# Patient Record
Sex: FEMALE | Race: WHITE | URBAN - METROPOLITAN AREA
[De-identification: names, ages, dates, MRNs, and addresses within clinical notes are randomized per-mention and may not be internally consistent; named-entity substitution may affect disease eponyms.]

---

## 2021-07-15 ENCOUNTER — OFFICE VISIT (OUTPATIENT)
Dept: URBAN - METROPOLITAN AREA CLINIC 74 | Facility: CLINIC | Age: 56
End: 2021-07-15

## 2021-08-11 ENCOUNTER — OFFICE VISIT (OUTPATIENT)
Dept: URBAN - METROPOLITAN AREA CLINIC 74 | Facility: CLINIC | Age: 56
End: 2021-08-11

## 2021-08-16 ENCOUNTER — OFFICE VISIT (OUTPATIENT)
Dept: URBAN - METROPOLITAN AREA CLINIC 74 | Facility: CLINIC | Age: 56
End: 2021-08-16

## 2021-08-16 RX ORDER — OMEPRAZOLE 40 MG/1
1 CAPSULE 30 MINUTES BEFORE MORNING MEAL CAPSULE, DELAYED RELEASE ORAL ONCE A DAY
Status: ACTIVE | COMMUNITY

## 2021-08-16 RX ORDER — SUCRALFATE 1 G/1
1 TABLET ON AN EMPTY STOMACH TABLET ORAL TWICE A DAY
Status: ACTIVE | COMMUNITY

## 2021-08-16 RX ORDER — ZOLPIDEM TARTRATE 5 MG/1
1 TABLET AT BEDTIME TABLET, FILM COATED ORAL ONCE A DAY
Status: ACTIVE | COMMUNITY

## 2021-08-16 RX ORDER — DEXLANSOPRAZOLE 60 MG/1
1 CAPSULE CAPSULE, DELAYED RELEASE ORAL ONCE A DAY
Status: ACTIVE | COMMUNITY

## 2021-08-16 RX ORDER — LORAZEPAM 1 MG/1
1 TABLET AT BEDTIME AS NEEDED TABLET ORAL ONCE A DAY
Status: ACTIVE | COMMUNITY

## 2021-08-16 RX ORDER — FLUCONAZOLE 100 MG/1
1 TABLET TABLET ORAL
Status: ACTIVE | COMMUNITY

## 2021-08-16 RX ORDER — SERTRALINE 50 MG/1
1 TABLET TABLET, FILM COATED ORAL ONCE A DAY
Status: ACTIVE | COMMUNITY

## 2021-08-16 RX ORDER — ONDANSETRON 4 MG/1
1 TABLET ON THE TONGUE AND ALLOW TO DISSOLVE TABLET, ORALLY DISINTEGRATING ORAL ONCE A DAY
Status: ACTIVE | COMMUNITY

## 2023-04-07 ENCOUNTER — WEB ENCOUNTER (OUTPATIENT)
Dept: URBAN - METROPOLITAN AREA CLINIC 40 | Facility: CLINIC | Age: 58
End: 2023-04-07

## 2023-04-07 ENCOUNTER — LAB OUTSIDE AN ENCOUNTER (OUTPATIENT)
Dept: URBAN - METROPOLITAN AREA CLINIC 40 | Facility: CLINIC | Age: 58
End: 2023-04-07

## 2023-04-07 ENCOUNTER — OFFICE VISIT (OUTPATIENT)
Dept: URBAN - METROPOLITAN AREA CLINIC 40 | Facility: CLINIC | Age: 58
End: 2023-04-07
Payer: COMMERCIAL

## 2023-04-07 VITALS
WEIGHT: 183 LBS | BODY MASS INDEX: 31.24 KG/M2 | SYSTOLIC BLOOD PRESSURE: 132 MMHG | HEIGHT: 64 IN | TEMPERATURE: 97.3 F | DIASTOLIC BLOOD PRESSURE: 80 MMHG | HEART RATE: 95 BPM

## 2023-04-07 DIAGNOSIS — R19.4 CHANGE IN BOWEL HABIT: ICD-10-CM

## 2023-04-07 DIAGNOSIS — R10.13 DYSPEPSIA: ICD-10-CM

## 2023-04-07 DIAGNOSIS — Z12.11 COLON CANCER SCREENING: ICD-10-CM

## 2023-04-07 PROCEDURE — 99204 OFFICE O/P NEW MOD 45 MIN: CPT | Performed by: INTERNAL MEDICINE

## 2023-04-07 RX ORDER — DEXLANSOPRAZOLE 60 MG/1
1 CAPSULE CAPSULE, DELAYED RELEASE ORAL ONCE A DAY
Status: DISCONTINUED | COMMUNITY

## 2023-04-07 RX ORDER — SERTRALINE 50 MG/1
1 TABLET TABLET, FILM COATED ORAL ONCE A DAY
Status: ACTIVE | COMMUNITY

## 2023-04-07 RX ORDER — LEVOTHYROXINE SODIUM 50 UG/1
AS DIRECTED TABLET ORAL
Status: ACTIVE | COMMUNITY
Start: 2023-04-07

## 2023-04-07 RX ORDER — TOPIRAMATE 25 MG/1
AS DIRECTED TABLET, COATED ORAL
Status: ACTIVE | COMMUNITY
Start: 2023-04-07

## 2023-04-07 RX ORDER — ONDANSETRON 4 MG/1
1 TABLET ON THE TONGUE AND ALLOW TO DISSOLVE TABLET, ORALLY DISINTEGRATING ORAL ONCE A DAY
Status: ACTIVE | COMMUNITY

## 2023-04-07 RX ORDER — FLUCONAZOLE 100 MG/1
1 TABLET TABLET ORAL
Status: DISCONTINUED | COMMUNITY

## 2023-04-07 RX ORDER — OMEPRAZOLE 40 MG/1
1 CAPSULE 30 MINUTES BEFORE MORNING MEAL CAPSULE, DELAYED RELEASE ORAL ONCE A DAY
OUTPATIENT

## 2023-04-07 RX ORDER — RABEPRAZOLE SODIUM 20 MG/1
1 TABLET TABLET, DELAYED RELEASE ORAL ONCE A DAY
Qty: 90 | Refills: 0 | OUTPATIENT
Start: 2023-04-07

## 2023-04-07 RX ORDER — OMEPRAZOLE 40 MG/1
1 CAPSULE 30 MINUTES BEFORE MORNING MEAL CAPSULE, DELAYED RELEASE ORAL ONCE A DAY
Status: ACTIVE | COMMUNITY

## 2023-04-07 RX ORDER — ZOLPIDEM TARTRATE 5 MG/1
1 TABLET AT BEDTIME TABLET, FILM COATED ORAL ONCE A DAY
Status: ACTIVE | COMMUNITY

## 2023-04-07 RX ORDER — SUCRALFATE 1 G/1
1 TABLET ON AN EMPTY STOMACH TABLET ORAL TWICE A DAY
Status: DISCONTINUED | COMMUNITY

## 2023-04-07 RX ORDER — LORAZEPAM 1 MG/1
1 TABLET AT BEDTIME AS NEEDED TABLET ORAL ONCE A DAY
Status: ACTIVE | COMMUNITY

## 2023-04-07 RX ORDER — PROGESTERONE 200 MG/1
AS DIRECTED CAPSULE ORAL
Status: ACTIVE | COMMUNITY

## 2023-04-07 NOTE — HPI-TODAY'S VISIT:
Ms. Sandoval is a 57-year-old white female presenting for new patient evaluation of reflux.  Patient states for years she has had issues with her stomach.  She will feel a burning sensation.  She is noted issues with throat clearing, cough and occasional hoarseness.  She is also noting midepigastric discomfort.  She describes it as a squeezing sensation under her bra line.  There is no relationship to eating.  She sometimes feels like she has a sour stomach.  She has occasional nausea without any emesis.  She has been on omeprazole 40 mg and has been on this for a while.  She think she is also had Zantac and Nexium in the past.  She had an EGD with Dr. Israel 6 to 7 years ago.  She states she is never had a screening colonoscopy.  She notes occasional loose stools alternating with constipation.  She states the diarrhea is more predominant and sometimes the stools look greasy and float.  There is no blood in the stool or melena.  Patient states she did a 23 and me genetic test and celiac disease was brought up as a possibility for her.

## 2023-04-20 ENCOUNTER — TELEPHONE ENCOUNTER (OUTPATIENT)
Dept: URBAN - METROPOLITAN AREA CLINIC 40 | Facility: CLINIC | Age: 58
End: 2023-04-20

## 2023-04-21 ENCOUNTER — WEB ENCOUNTER (OUTPATIENT)
Dept: URBAN - METROPOLITAN AREA CLINIC 40 | Facility: CLINIC | Age: 58
End: 2023-04-21

## 2023-04-28 ENCOUNTER — OFFICE VISIT (OUTPATIENT)
Dept: URBAN - METROPOLITAN AREA CLINIC 39 | Facility: CLINIC | Age: 58
End: 2023-04-28
Payer: COMMERCIAL

## 2023-04-28 DIAGNOSIS — R10.13 DYSPEPSIA: ICD-10-CM

## 2023-04-28 DIAGNOSIS — N20.0 KIDNEY CALCULI: ICD-10-CM

## 2023-04-28 PROCEDURE — 76705 ECHO EXAM OF ABDOMEN: CPT | Performed by: INTERNAL MEDICINE

## 2023-05-05 ENCOUNTER — LAB OUTSIDE AN ENCOUNTER (OUTPATIENT)
Dept: URBAN - METROPOLITAN AREA CLINIC 40 | Facility: CLINIC | Age: 58
End: 2023-05-05

## 2023-05-09 LAB
ADENOVIRUS F 40/41: NOT DETECTED
CAMPYLOBACTER: NOT DETECTED
CLOSTRIDIUM DIFFICILE: NOT DETECTED
CRYPTOSPORIDIUM: NOT DETECTED
ENTAMOEBA HISTOLYTICA: NOT DETECTED
ENTEROAGGREGATIVE E.COLI: NOT DETECTED
ENTEROTOXIGENIC E.COLI: NOT DETECTED
ESCHERICHIA COLI O157: NOT DETECTED
FECAL FAT, QUALITATIVE: (no result)
GIARDIA LAMBLIA: NOT DETECTED
NOROVIRUS GI/GII: NOT DETECTED
PANCREATICELASTASE ELISA, STOOL: (no result)
ROTAVIRUS A: NOT DETECTED
SALMONELLA SPP.: NOT DETECTED
SHIGA-LIKE TOXIN PRODUCING E.COLI: NOT DETECTED
SHIGELLA SPP. / ENTEROINVASIVE E.COLI: NOT DETECTED
VIBRIO PARAHAEMOLYTICUS: NOT DETECTED
VIBRIO SPP.: NOT DETECTED
YERSINIA ENTEROCOLITICA: NOT DETECTED

## 2023-05-12 ENCOUNTER — TELEPHONE ENCOUNTER (OUTPATIENT)
Dept: URBAN - METROPOLITAN AREA CLINIC 40 | Facility: CLINIC | Age: 58
End: 2023-05-12

## 2023-05-12 RX ORDER — COLESTIPOL HYDROCHLORIDE 1 G/1
1 TABLET TABLET, FILM COATED ORAL
Qty: 180 | Refills: 1 | OUTPATIENT
Start: 2023-05-12

## 2023-05-22 ENCOUNTER — TELEPHONE ENCOUNTER (OUTPATIENT)
Dept: URBAN - METROPOLITAN AREA CLINIC 40 | Facility: CLINIC | Age: 58
End: 2023-05-22

## 2023-05-25 ENCOUNTER — OFFICE VISIT (OUTPATIENT)
Dept: URBAN - METROPOLITAN AREA SURGERY CENTER 30 | Facility: SURGERY CENTER | Age: 58
End: 2023-05-25

## 2024-01-18 ENCOUNTER — LAB OUTSIDE AN ENCOUNTER (OUTPATIENT)
Dept: URBAN - METROPOLITAN AREA CLINIC 40 | Facility: CLINIC | Age: 59
End: 2024-01-18

## 2024-01-18 ENCOUNTER — OFFICE VISIT (OUTPATIENT)
Dept: URBAN - METROPOLITAN AREA CLINIC 40 | Facility: CLINIC | Age: 59
End: 2024-01-18
Payer: COMMERCIAL

## 2024-01-18 VITALS
HEART RATE: 91 BPM | BODY MASS INDEX: 30.05 KG/M2 | HEIGHT: 64 IN | DIASTOLIC BLOOD PRESSURE: 78 MMHG | SYSTOLIC BLOOD PRESSURE: 138 MMHG | WEIGHT: 176 LBS

## 2024-01-18 DIAGNOSIS — Z53.20 COLON CANCER SCREENING DECLINED: ICD-10-CM

## 2024-01-18 DIAGNOSIS — R19.7 DIARRHEA: ICD-10-CM

## 2024-01-18 DIAGNOSIS — K21.9 GERD: ICD-10-CM

## 2024-01-18 PROCEDURE — 99214 OFFICE O/P EST MOD 30 MIN: CPT | Performed by: INTERNAL MEDICINE

## 2024-01-18 RX ORDER — ONDANSETRON 4 MG/1
1 TABLET ON THE TONGUE AND ALLOW TO DISSOLVE TABLET, ORALLY DISINTEGRATING ORAL ONCE A DAY
Status: ACTIVE | COMMUNITY

## 2024-01-18 RX ORDER — LEVOTHYROXINE SODIUM 50 UG/1
AS DIRECTED TABLET ORAL
Status: ACTIVE | COMMUNITY
Start: 2023-04-07

## 2024-01-18 RX ORDER — COLESTIPOL HYDROCHLORIDE 1 G/1
1 TABLET TABLET, FILM COATED ORAL
Qty: 180 | Refills: 1 | Status: DISCONTINUED | COMMUNITY
Start: 2023-05-12

## 2024-01-18 RX ORDER — SERTRALINE 50 MG/1
1 TABLET TABLET, FILM COATED ORAL ONCE A DAY
Status: ACTIVE | COMMUNITY

## 2024-01-18 RX ORDER — RABEPRAZOLE SODIUM 20 MG/1
1 TABLET TABLET, DELAYED RELEASE ORAL ONCE A DAY
Qty: 90 | Refills: 0 | Status: DISCONTINUED | COMMUNITY
Start: 2023-04-07

## 2024-01-18 RX ORDER — LORAZEPAM 1 MG/1
1 TABLET AT BEDTIME AS NEEDED TABLET ORAL ONCE A DAY
Status: ACTIVE | COMMUNITY

## 2024-01-18 RX ORDER — PROGESTERONE 200 MG/1
AS DIRECTED CAPSULE ORAL
Status: DISCONTINUED | COMMUNITY

## 2024-01-18 RX ORDER — PANTOPRAZOLE SODIUM 40 MG/1
1 TABLET TABLET, DELAYED RELEASE ORAL ONCE A DAY
Status: ACTIVE | COMMUNITY

## 2024-01-18 RX ORDER — ZOLPIDEM TARTRATE 5 MG/1
1 TABLET AT BEDTIME TABLET, FILM COATED ORAL ONCE A DAY
Status: ACTIVE | COMMUNITY

## 2024-01-18 RX ORDER — PANTOPRAZOLE SODIUM 40 MG/1
1 TABLET TABLET, DELAYED RELEASE ORAL ONCE A DAY
OUTPATIENT

## 2024-01-18 RX ORDER — TOPIRAMATE 25 MG/1
AS DIRECTED TABLET, COATED ORAL
Status: ACTIVE | COMMUNITY
Start: 2023-04-07

## 2024-01-18 RX ORDER — DEXLANSOPRAZOLE 60 MG/1
1 CAPSULE CAPSULE, DELAYED RELEASE ORAL ONCE A DAY
Qty: 90 CAPSULE | Refills: 0 | OUTPATIENT
Start: 2024-01-18

## 2024-01-18 NOTE — HPI-TODAY'S VISIT:
Ms. Sandoval presents to clinic for follow-up.  She was last seen in April 2023.  At that appointment she was complaining of a change in her bowels.  She also was complaining of dyspepsia.  We got stool studies that were unremarkable.  Without a right upper quadrant ultrasound in April 2023 that showed a normal liver and gallbladder.  An EGD was ordered but patient canceled.  She states she was hospitalized with palpitations and had a cardiac workup and never returned for follow-up.  We  had changed her from omeprazole to rabeprazole.  She states the reflux is better controlled but the rabeprazole caused side effects with coughing.  She is currently taking pantoprazole but is having breakthrough.  Recall we had offered a colonoscopy but patient declined.  She states she is concerned about prepping due to her history of vasovagal syncope.  She has noted her diarrhea has been worse over the last several weeks.  She is having 2-3 bowel movements that are loose a day.  Sometimes the stools wake her from sleep.  There is no blood in the stool or melena.  She recently got over COVID and states she had been on antibiotics over the last week or 2.  She states she has been taking probiotics with the antibiotic.

## 2024-01-22 ENCOUNTER — TELEPHONE ENCOUNTER (OUTPATIENT)
Dept: URBAN - METROPOLITAN AREA CLINIC 40 | Facility: CLINIC | Age: 59
End: 2024-01-22

## 2024-01-28 ENCOUNTER — LAB OUTSIDE AN ENCOUNTER (OUTPATIENT)
Dept: URBAN - METROPOLITAN AREA CLINIC 40 | Facility: CLINIC | Age: 59
End: 2024-01-28

## 2024-02-01 LAB
ADENOVIRUS F 40/41: NOT DETECTED
CALPROTECTIN, STOOL - QDX: (no result)
CAMPYLOBACTER: NOT DETECTED
CLOSTRIDIUM DIFFICILE: NOT DETECTED
ENTAMOEBA HISTOLYTICA: NOT DETECTED
ENTEROAGGREGATIVE E.COLI: NOT DETECTED
ENTEROTOXIGENIC E.COLI: NOT DETECTED
ESCHERICHIA COLI O157: NOT DETECTED
FECAL FAT, QUALITATIVE: (no result)
GIARDIA LAMBLIA: NOT DETECTED
NOROVIRUS GI/GII: NOT DETECTED
PANCREATICELASTASE ELISA, STOOL: (no result)
ROTAVIRUS A: NOT DETECTED
SALMONELLA SPP.: NOT DETECTED
SHIGA-LIKE TOXIN PRODUCING E.COLI: NOT DETECTED
SHIGELLA SPP. / ENTEROINVASIVE E.COLI: NOT DETECTED
VIBRIO PARAHAEMOLYTICUS: NOT DETECTED
VIBRIO SPP.: NOT DETECTED
YERSINIA ENTEROCOLITICA: NOT DETECTED

## 2024-02-12 ENCOUNTER — EGD (OUTPATIENT)
Dept: URBAN - METROPOLITAN AREA SURGERY CENTER 30 | Facility: SURGERY CENTER | Age: 59
End: 2024-02-12

## 2024-02-26 ENCOUNTER — LAB (OUTPATIENT)
Dept: URBAN - METROPOLITAN AREA CLINIC 4 | Facility: CLINIC | Age: 59
End: 2024-02-26
Payer: COMMERCIAL

## 2024-02-26 ENCOUNTER — EGD (OUTPATIENT)
Dept: URBAN - METROPOLITAN AREA SURGERY CENTER 30 | Facility: SURGERY CENTER | Age: 59
End: 2024-02-26

## 2024-02-26 DIAGNOSIS — K31.89 OTHER DISEASES OF STOMACH AND DUODENUM: ICD-10-CM

## 2024-02-26 PROCEDURE — 88305 TISSUE EXAM BY PATHOLOGIST: CPT | Performed by: PATHOLOGY

## 2024-02-26 PROCEDURE — 88312 SPECIAL STAINS GROUP 1: CPT | Performed by: PATHOLOGY

## 2024-03-04 ENCOUNTER — OV EP (OUTPATIENT)
Dept: URBAN - METROPOLITAN AREA CLINIC 40 | Facility: CLINIC | Age: 59
End: 2024-03-04
Payer: COMMERCIAL

## 2024-03-04 VITALS
BODY MASS INDEX: 30.39 KG/M2 | WEIGHT: 178 LBS | TEMPERATURE: 96.1 F | SYSTOLIC BLOOD PRESSURE: 130 MMHG | DIASTOLIC BLOOD PRESSURE: 78 MMHG | HEART RATE: 64 BPM | HEIGHT: 64 IN

## 2024-03-04 DIAGNOSIS — K21.9 GERD: ICD-10-CM

## 2024-03-04 DIAGNOSIS — K29.70 GASTRITIS: ICD-10-CM

## 2024-03-04 DIAGNOSIS — Z53.20 COLON CANCER SCREENING DECLINED: ICD-10-CM

## 2024-03-04 PROCEDURE — 99214 OFFICE O/P EST MOD 30 MIN: CPT | Performed by: PHYSICIAN ASSISTANT

## 2024-03-04 RX ORDER — LORAZEPAM 1 MG/1
1 TABLET AT BEDTIME AS NEEDED TABLET ORAL ONCE A DAY
Status: ACTIVE | COMMUNITY

## 2024-03-04 RX ORDER — SERTRALINE 50 MG/1
1 TABLET TABLET, FILM COATED ORAL ONCE A DAY
Status: ACTIVE | COMMUNITY

## 2024-03-04 RX ORDER — SUCRALFATE 1 G/1
1 TABLET ON AN EMPTY STOMACH TABLET ORAL TWICE A DAY
Qty: 60 TABLETS | Refills: 1 | OUTPATIENT
Start: 2024-03-04 | End: 2024-05-03

## 2024-03-04 RX ORDER — DEXLANSOPRAZOLE 60 MG/1
1 CAPSULE CAPSULE, DELAYED RELEASE ORAL ONCE A DAY
Qty: 90 CAPSULE | Refills: 0 | Status: ON HOLD | COMMUNITY
Start: 2024-01-18

## 2024-03-04 RX ORDER — ZOLPIDEM TARTRATE 5 MG/1
1 TABLET AT BEDTIME TABLET, FILM COATED ORAL ONCE A DAY
Status: ACTIVE | COMMUNITY

## 2024-03-04 RX ORDER — TOPIRAMATE 25 MG/1
AS DIRECTED TABLET, COATED ORAL
Status: ACTIVE | COMMUNITY
Start: 2023-04-07

## 2024-03-04 RX ORDER — ONDANSETRON 4 MG/1
1 TABLET ON THE TONGUE AND ALLOW TO DISSOLVE TABLET, ORALLY DISINTEGRATING ORAL ONCE A DAY
Status: ACTIVE | COMMUNITY

## 2024-03-04 RX ORDER — LEVOTHYROXINE SODIUM 50 UG/1
AS DIRECTED TABLET ORAL
Status: ACTIVE | COMMUNITY
Start: 2023-04-07

## 2024-03-04 NOTE — HPI-TODAY'S VISIT:
Ms. Sandoval FAUSTO 58 year old female, last seen 1/18/24 with dyspeptic symptoms. Diarrhea. History of abdominal pain. We got a right upper quadrant ultrasound in April 2023 that showed a normal liver and gallbladder.  An EGD was ordered but patient canceled.  She states she was hospitalized with palpitations and had a cardiac workup and never returned for follow-up.  We  had changed her from omeprazole to rabeprazole.  She states the reflux is better controlled but the rabeprazole caused side effects with coughing.  She is currently taking pantoprazole but is having breakthrough.  Recall we had offered a colonoscopy but patient declined.  She states she is concerned about prepping due to her history of vasovagal syncope.  She has noted her diarrhea has been worse over the last several weeks.  She is having 2-3 bowel movements that are loose a day.  Sometimes the stools wake her from sleep.  There is no blood in the stool or melena.  She recently got over COVID and states she had been on antibiotics.  She states she has been taking probiotics with the antibiotic. She did have an EGD February 26, 2024 with findings of mild gastritis.  Normal duodenum.  Biopsies from the stomach with no significant abnormality.  Similar findings of the small bowel with no evidence of celiac sprue, biopsy due to complaint of diarrhea.  Reflux changes in the esophagus with no Sanchez's metaplasia, EOE or dysplasia identified.  Stool studies were once again checked and included normal fecal fat and elastase.  Fecal calprotectin normal.  No evidence of infection. Her diarrhea has improved overall.  She was not able to get prescription for Dexilant. Yes

## 2024-04-03 ENCOUNTER — OV EP (OUTPATIENT)
Dept: URBAN - METROPOLITAN AREA CLINIC 40 | Facility: CLINIC | Age: 59
End: 2024-04-03

## 2024-05-28 ENCOUNTER — ERX REFILL RESPONSE (OUTPATIENT)
Dept: URBAN - METROPOLITAN AREA CLINIC 40 | Facility: CLINIC | Age: 59
End: 2024-05-28

## 2024-05-28 RX ORDER — SUCRALFATE 1 G/1
TAKE 1 TABLET BY MOUTH TWICE DAILY ON AN EMPTY STOMACH TABLET ORAL
Qty: 60 TABLET | Refills: 0 | OUTPATIENT

## 2024-05-29 ENCOUNTER — TELEPHONE ENCOUNTER (OUTPATIENT)
Dept: URBAN - METROPOLITAN AREA CLINIC 40 | Facility: CLINIC | Age: 59
End: 2024-05-29

## 2024-06-20 ENCOUNTER — TELEPHONE ENCOUNTER (OUTPATIENT)
Dept: URBAN - METROPOLITAN AREA CLINIC 80 | Facility: CLINIC | Age: 59
End: 2024-06-20

## 2024-06-21 ENCOUNTER — OFFICE VISIT (OUTPATIENT)
Dept: URBAN - METROPOLITAN AREA CLINIC 80 | Facility: CLINIC | Age: 59
End: 2024-06-21

## 2024-06-24 ENCOUNTER — OFFICE VISIT (OUTPATIENT)
Dept: URBAN - METROPOLITAN AREA CLINIC 40 | Facility: CLINIC | Age: 59
End: 2024-06-24

## 2024-06-24 RX ORDER — TOPIRAMATE 25 MG/1
AS DIRECTED TABLET, COATED ORAL
Status: ACTIVE | COMMUNITY
Start: 2023-04-07

## 2024-06-24 RX ORDER — LORAZEPAM 1 MG/1
1 TABLET AT BEDTIME AS NEEDED TABLET ORAL ONCE A DAY
Status: ACTIVE | COMMUNITY

## 2024-06-24 RX ORDER — ONDANSETRON 4 MG/1
1 TABLET ON THE TONGUE AND ALLOW TO DISSOLVE TABLET, ORALLY DISINTEGRATING ORAL ONCE A DAY
Status: ACTIVE | COMMUNITY

## 2024-06-24 RX ORDER — SERTRALINE 50 MG/1
1 TABLET TABLET, FILM COATED ORAL ONCE A DAY
Status: ACTIVE | COMMUNITY

## 2024-06-24 RX ORDER — ZOLPIDEM TARTRATE 5 MG/1
1 TABLET AT BEDTIME TABLET, FILM COATED ORAL ONCE A DAY
Status: ACTIVE | COMMUNITY

## 2024-06-24 RX ORDER — DEXLANSOPRAZOLE 60 MG/1
1 CAPSULE CAPSULE, DELAYED RELEASE ORAL ONCE A DAY
Qty: 90 CAPSULE | Refills: 0 | Status: ON HOLD | COMMUNITY
Start: 2024-01-18

## 2024-06-24 RX ORDER — LEVOTHYROXINE SODIUM 50 UG/1
AS DIRECTED TABLET ORAL
Status: ACTIVE | COMMUNITY
Start: 2023-04-07

## 2024-06-24 RX ORDER — SUCRALFATE 1 G/1
TAKE 1 TABLET BY MOUTH TWICE DAILY ON AN EMPTY STOMACH TABLET ORAL
Qty: 60 TABLET | Refills: 0 | Status: ACTIVE | COMMUNITY

## 2024-06-24 NOTE — HPI-TODAY'S VISIT:
Ms. Sandoval FAUSTO 58 year old female, last seen 1/18/24 with dyspeptic symptoms. Diarrhea. History of abdominal pain. We got a right upper quadrant ultrasound in April 2023 that showed a normal liver and gallbladder.  An EGD was ordered but patient canceled.  She states she was hospitalized with palpitations and had a cardiac workup and never returned for follow-up.  We  had changed her from omeprazole to rabeprazole.  She states the reflux is better controlled but the rabeprazole caused side effects with coughing.  She is currently taking pantoprazole but is having breakthrough.  Recall we had offered a colonoscopy but patient declined.  She states she is concerned about prepping due to her history of vasovagal syncope.  She has noted her diarrhea has been worse over the last several weeks.  She is having 2-3 bowel movements that are loose a day.  Sometimes the stools wake her from sleep.  There is no blood in the stool or melena.  She recently got over COVID and states she had been on antibiotics.  She states she has been taking probiotics with the antibiotic. She did have an EGD February 26, 2024 with findings of mild gastritis.  Normal duodenum.  Biopsies from the stomach with no significant abnormality.  Similar findings of the small bowel with no evidence of celiac sprue, biopsy due to complaint of diarrhea.  Reflux changes in the esophagus with no Sanchez's metaplasia, EOE or dysplasia identified.  Stool studies were once again checked and included normal fecal fat and elastase.  Fecal calprotectin normal.  No evidence of infection. Her diarrhea has improved overall.  She was not able to get prescription for Dexilant.

## 2024-06-27 ENCOUNTER — ERX REFILL RESPONSE (OUTPATIENT)
Dept: URBAN - METROPOLITAN AREA CLINIC 40 | Facility: CLINIC | Age: 59
End: 2024-06-27

## 2024-06-27 RX ORDER — SUCRALFATE 1 G/1
TAKE 1 TABLET BY MOUTH TWICE DAILY ON AN EMPTY STOMACH TABLET ORAL
Qty: 60 TABLET | Refills: 0 | OUTPATIENT

## 2024-07-29 ENCOUNTER — ERX REFILL RESPONSE (OUTPATIENT)
Dept: URBAN - METROPOLITAN AREA CLINIC 40 | Facility: CLINIC | Age: 59
End: 2024-07-29

## 2024-07-29 RX ORDER — SUCRALFATE 1 G/1
TAKE 1 TABLET BY MOUTH TWICE DAILY ON AN EMPTY STOMACH TABLET ORAL
Qty: 60 TABLET | Refills: 0 | OUTPATIENT

## 2024-07-31 ENCOUNTER — DASHBOARD ENCOUNTERS (OUTPATIENT)
Age: 59
End: 2024-07-31

## 2024-08-01 PROBLEM — 197321007: Status: ACTIVE | Noted: 2024-08-01

## 2024-08-07 ENCOUNTER — OFFICE VISIT (OUTPATIENT)
Dept: URBAN - METROPOLITAN AREA CLINIC 74 | Facility: CLINIC | Age: 59
End: 2024-08-07
Payer: COMMERCIAL

## 2024-08-07 ENCOUNTER — LAB OUTSIDE AN ENCOUNTER (OUTPATIENT)
Dept: URBAN - METROPOLITAN AREA CLINIC 74 | Facility: CLINIC | Age: 59
End: 2024-08-07

## 2024-08-07 VITALS
HEART RATE: 99 BPM | WEIGHT: 171 LBS | DIASTOLIC BLOOD PRESSURE: 88 MMHG | TEMPERATURE: 98 F | HEIGHT: 63 IN | BODY MASS INDEX: 30.3 KG/M2 | SYSTOLIC BLOOD PRESSURE: 110 MMHG

## 2024-08-07 DIAGNOSIS — K52.89 (LYMPHOCYTIC) MICROSCOPIC COLITIS: ICD-10-CM

## 2024-08-07 DIAGNOSIS — K76.0 HEPATIC STEATOSIS: ICD-10-CM

## 2024-08-07 DIAGNOSIS — K29.30 CHRONIC SUPERFICIAL GASTRITIS WITHOUT BLEEDING: ICD-10-CM

## 2024-08-07 DIAGNOSIS — K92.1 HEMATOCHEZIA: ICD-10-CM

## 2024-08-07 PROBLEM — 266433003: Status: ACTIVE | Noted: 2024-08-07

## 2024-08-07 PROBLEM — 196735001: Status: ACTIVE | Noted: 2024-08-07

## 2024-08-07 PROCEDURE — 99214 OFFICE O/P EST MOD 30 MIN: CPT | Performed by: PHYSICIAN ASSISTANT

## 2024-08-07 RX ORDER — TOPIRAMATE 25 MG/1
AS DIRECTED TABLET, COATED ORAL
Status: ACTIVE | COMMUNITY
Start: 2023-04-07

## 2024-08-07 RX ORDER — SUCRALFATE 1 G/1
TAKE 1 TABLET BY MOUTH TWICE DAILY ON AN EMPTY STOMACH TABLET ORAL
Qty: 60 TABLET | Refills: 0 | Status: DISCONTINUED | COMMUNITY

## 2024-08-07 RX ORDER — PANTOPRAZOLE SODIUM 40 MG/1
1 TABLET TABLET, DELAYED RELEASE ORAL ONCE A DAY
Status: ACTIVE | COMMUNITY

## 2024-08-07 RX ORDER — ONDANSETRON 4 MG/1
1 TABLET ON THE TONGUE AND ALLOW TO DISSOLVE TABLET, ORALLY DISINTEGRATING ORAL ONCE A DAY
Status: ACTIVE | COMMUNITY

## 2024-08-07 RX ORDER — ZOLPIDEM TARTRATE 5 MG/1
1 TABLET AT BEDTIME TABLET, FILM COATED ORAL ONCE A DAY
Status: ACTIVE | COMMUNITY

## 2024-08-07 RX ORDER — DICYCLOMINE HYDROCHLORIDE 20 MG/1
1 TABLET TABLET ORAL THREE TIMES A DAY
Status: ON HOLD | COMMUNITY

## 2024-08-07 RX ORDER — SERTRALINE 50 MG/1
1 TABLET TABLET, FILM COATED ORAL ONCE A DAY
Status: ACTIVE | COMMUNITY

## 2024-08-07 RX ORDER — LEVOTHYROXINE SODIUM 50 UG/1
AS DIRECTED TABLET ORAL
Status: ACTIVE | COMMUNITY
Start: 2023-04-07

## 2024-08-07 RX ORDER — LORAZEPAM 1 MG/1
1 TABLET AT BEDTIME AS NEEDED TABLET ORAL ONCE A DAY
Status: ACTIVE | COMMUNITY

## 2024-08-07 NOTE — HPI-TODAY'S VISIT:
The patient is 59-year-old female with past medical history as noted below known to Dr. Cortes is presented to our clinic today to schedule colon cancer screening. The patient was seen in ED at  on 05/30/2027 with complaint of abdominal pain and bloody stool.  Labs and imaging as noted below.  She was treated for colitis with Augmentin 875 mg for 10 days therapy and Dicyclomine 20 mg 1 tablet every 6 hours as needed.  She states that she is no longer on Dicyclomine. She has alternating bowel movement on and off for years. She saw [facility] in May of 2024 for Vasovagal syndrome and SVT. Cardiac work up unremarakble. She currently takes Pantoprazole 40 mg once daily with good response. No other gI issues today.   Diagnostic studies: -- CT scan of the abdomen and pelvis with IV contrast on 05/30/2024 with long segment edematous thickening of the descending and sigmoid colon likely representing colitis.  There is no evidence of small bowel obstruction or acute appendicitis.  Edematous thickening of the gastric antral wall, which may represent gastritis.  Small free fluid in the pelvis.  Hepatic steatosis.  -- Labs on 05/30/2024 BMP with BUN 12, creatinine 0.9, sodium 137, potassium 4.1.  CBC with WBC 13.2, hemoglobin 14.4, hematocrit 43.3, platelet 280.  UA unremarkable.  Procedure: -- EGD with biopsy on 02/26/2024 by Dr. Cortes noted Z-line irregular, 38 cm from incisors.  Gastritis.  No gross lesion entire examined duodenum.  Biopsy small bowel with no significant abnormality.  Stomach with no significant abnormality.  Esophagus with reflux type changes.  No Sanchez's esophagus or eosinophilic esophagitis.

## 2024-08-08 ENCOUNTER — TELEPHONE ENCOUNTER (OUTPATIENT)
Dept: URBAN - METROPOLITAN AREA CLINIC 74 | Facility: CLINIC | Age: 59
End: 2024-08-08

## 2024-08-08 LAB
ABSOLUTE BASOPHILS: 62
ABSOLUTE EOSINOPHILS: 101
ABSOLUTE LYMPHOCYTES: 1771
ABSOLUTE MONOCYTES: 382
ABSOLUTE NEUTROPHILS: 5483
ALBUMIN/GLOBULIN RATIO: 1.5
ALBUMIN: 4.1
ALKALINE PHOSPHATASE: 29
ALT: 23
AST: 17
BASOPHILS: 0.8
BILIRUBIN, TOTAL: 0.4
BUN/CREATININE RATIO: (no result)
C-REACTIVE PROTEIN, QUANT: 9.1
CALCIUM: 9.1
CARBON DIOXIDE: 20
CHLORIDE: 104
CREATININE: 1.02
EGFR: 63
EOSINOPHILS: 1.3
FIB 4 INDEX: 0.84
FIB 4 INTERPRETATION: (no result)
GGT: 30
GLOBULIN: 2.8
GLUCOSE: 113
HEMATOCRIT: 41.5
HEMOGLOBIN: 13.6
LYMPHOCYTES: 22.7
MCH: 30.9
MCHC: 32.8
MCV: 94.3
MONOCYTES: 4.9
MPV: 10.2
NEUTROPHILS: 70.3
PLATELET COUNT: 257
PLATELET COUNT: 257
POTASSIUM: 4
PROTEIN, TOTAL: 6.9
RDW: 12.7
RED BLOOD CELL COUNT: 4.4
SODIUM: 136
UREA NITROGEN (BUN): 16
WHITE BLOOD CELL COUNT: 7.8

## 2024-08-19 ENCOUNTER — OFFICE VISIT (OUTPATIENT)
Dept: URBAN - METROPOLITAN AREA CLINIC 40 | Facility: CLINIC | Age: 59
End: 2024-08-19

## 2024-09-04 ENCOUNTER — TELEPHONE ENCOUNTER (OUTPATIENT)
Dept: URBAN - METROPOLITAN AREA CLINIC 74 | Facility: CLINIC | Age: 59
End: 2024-09-04

## 2024-10-15 ENCOUNTER — TELEPHONE ENCOUNTER (OUTPATIENT)
Dept: URBAN - METROPOLITAN AREA CLINIC 74 | Facility: CLINIC | Age: 59
End: 2024-10-15

## 2024-10-15 RX ORDER — PANTOPRAZOLE SODIUM 40 MG/1
1 TABLET TABLET, DELAYED RELEASE ORAL ONCE A DAY
Qty: 90 TABLET | Refills: 1

## 2024-12-05 ENCOUNTER — TELEPHONE ENCOUNTER (OUTPATIENT)
Dept: URBAN - METROPOLITAN AREA CLINIC 40 | Facility: CLINIC | Age: 59
End: 2024-12-05

## 2024-12-05 ENCOUNTER — OFFICE VISIT (OUTPATIENT)
Dept: URBAN - METROPOLITAN AREA MEDICAL CENTER 9 | Facility: MEDICAL CENTER | Age: 59
End: 2024-12-05
Payer: COMMERCIAL

## 2024-12-05 DIAGNOSIS — Z12.11 COLON CANCER SCREENING: ICD-10-CM

## 2024-12-05 DIAGNOSIS — K63.89 OTHER SPECIFIED DISEASES OF INTESTINE: ICD-10-CM

## 2024-12-05 PROCEDURE — 45380 COLONOSCOPY AND BIOPSY: CPT | Performed by: INTERNAL MEDICINE

## 2024-12-05 RX ORDER — TOPIRAMATE 25 MG/1
AS DIRECTED TABLET, COATED ORAL
Status: ACTIVE | COMMUNITY
Start: 2023-04-07

## 2024-12-05 RX ORDER — SERTRALINE 50 MG/1
1 TABLET TABLET, FILM COATED ORAL ONCE A DAY
Status: ACTIVE | COMMUNITY

## 2024-12-05 RX ORDER — DICYCLOMINE HYDROCHLORIDE 20 MG/1
1 TABLET TABLET ORAL THREE TIMES A DAY
Status: ON HOLD | COMMUNITY

## 2024-12-05 RX ORDER — LORAZEPAM 1 MG/1
1 TABLET AT BEDTIME AS NEEDED TABLET ORAL ONCE A DAY
Status: ACTIVE | COMMUNITY

## 2024-12-05 RX ORDER — ZOLPIDEM TARTRATE 5 MG/1
1 TABLET AT BEDTIME TABLET, FILM COATED ORAL ONCE A DAY
Status: ACTIVE | COMMUNITY

## 2024-12-05 RX ORDER — ONDANSETRON 4 MG/1
1 TABLET ON THE TONGUE AND ALLOW TO DISSOLVE TABLET, ORALLY DISINTEGRATING ORAL ONCE A DAY
Status: ACTIVE | COMMUNITY

## 2024-12-05 RX ORDER — LEVOTHYROXINE SODIUM 50 UG/1
AS DIRECTED TABLET ORAL
Status: ACTIVE | COMMUNITY
Start: 2023-04-07

## 2024-12-05 RX ORDER — PANTOPRAZOLE SODIUM 40 MG/1
1 TABLET TABLET, DELAYED RELEASE ORAL ONCE A DAY
Qty: 90 TABLET | Refills: 1 | Status: ACTIVE | COMMUNITY

## 2024-12-05 RX ORDER — PANTOPRAZOLE SODIUM 40 MG/1
1 TABLET TABLET, DELAYED RELEASE ORAL ONCE A DAY
Qty: 90 TABLET | Refills: 1

## 2024-12-20 ENCOUNTER — OFFICE VISIT (OUTPATIENT)
Dept: URBAN - METROPOLITAN AREA CLINIC 74 | Facility: CLINIC | Age: 59
End: 2024-12-20

## 2024-12-20 NOTE — HPI-TODAY'S VISIT:
The patient is 59-year-old female with past medical history as noted below known to Dr. Cortes is presenting to our clinic to discuss her recent Colonoscopy results.   Diagnostic studies: -- Labs on 08/07/2024 CBC, CMP, GGT, and FIB-4 INDEX 0.84 with unremarkable results. CRP 9.1.  -- CT scan of the abdomen and pelvis with IV contrast on 05/30/2024 with long segment edematous thickening of the descending and sigmoid colon likely representing colitis.  There is no evidence of small bowel obstruction or acute appendicitis.  Edematous thickening of the gastric antral wall, which may represent gastritis.  Small free fluid in the pelvis.  Hepatic steatosis.  -- Labs on 05/30/2024 BMP with BUN 12, creatinine 0.9, sodium 137, potassium 4.1.  CBC with WBC 13.2, hemoglobin 14.4, hematocrit 43.3, platelet 280.  UA unremarkable.  Procedures: -- Colonoscopy on 12/05/2024 by Dr. Cortes noted the examined portion of the ileum was normal.  Internal hemorrhoids.  Normal mucosa entire examined colon.  Repeat colonoscopy in 10 years for surveillance.  Biopsy with no acute, chronic, or microscopic colitis.  -- EGD with biopsy on 02/26/2024 by Dr. Cortes noted Z-line irregular, 38 cm from incisors.  Gastritis.  No gross lesion entire examined duodenum.  Biopsy small bowel with no significant abnormality.  Stomach with no significant abnormality.  Esophagus with reflux type changes.  No Sanchez's esophagus or eosinophilic esophagitis.

## 2025-02-11 ENCOUNTER — OFFICE VISIT (OUTPATIENT)
Dept: URBAN - METROPOLITAN AREA CLINIC 74 | Facility: CLINIC | Age: 60
End: 2025-02-11

## 2025-02-11 RX ORDER — TOPIRAMATE 25 MG/1
AS DIRECTED TABLET, COATED ORAL
Status: ACTIVE | COMMUNITY
Start: 2023-04-07

## 2025-02-11 RX ORDER — LORAZEPAM 1 MG/1
1 TABLET AT BEDTIME AS NEEDED TABLET ORAL ONCE A DAY
Status: ACTIVE | COMMUNITY

## 2025-02-11 RX ORDER — ZOLPIDEM TARTRATE 5 MG/1
1 TABLET AT BEDTIME TABLET, FILM COATED ORAL ONCE A DAY
Status: ACTIVE | COMMUNITY

## 2025-02-11 RX ORDER — ONDANSETRON 4 MG/1
1 TABLET ON THE TONGUE AND ALLOW TO DISSOLVE TABLET, ORALLY DISINTEGRATING ORAL ONCE A DAY
Status: ACTIVE | COMMUNITY

## 2025-02-11 RX ORDER — LEVOTHYROXINE SODIUM 50 UG/1
AS DIRECTED TABLET ORAL
Status: ACTIVE | COMMUNITY
Start: 2023-04-07

## 2025-02-11 RX ORDER — SERTRALINE 50 MG/1
1 TABLET TABLET, FILM COATED ORAL ONCE A DAY
Status: ACTIVE | COMMUNITY

## 2025-02-11 RX ORDER — PANTOPRAZOLE SODIUM 40 MG/1
1 TABLET TABLET, DELAYED RELEASE ORAL ONCE A DAY
Qty: 90 TABLET | Refills: 1 | Status: ACTIVE | COMMUNITY

## 2025-02-21 ENCOUNTER — OFFICE VISIT (OUTPATIENT)
Dept: URBAN - METROPOLITAN AREA CLINIC 74 | Facility: CLINIC | Age: 60
End: 2025-02-21

## 2025-02-21 RX ORDER — PANTOPRAZOLE SODIUM 40 MG/1
1 TABLET TABLET, DELAYED RELEASE ORAL ONCE A DAY
Qty: 90 TABLET | Refills: 1 | Status: ACTIVE | COMMUNITY

## 2025-02-21 RX ORDER — ONDANSETRON 4 MG/1
1 TABLET ON THE TONGUE AND ALLOW TO DISSOLVE TABLET, ORALLY DISINTEGRATING ORAL ONCE A DAY
Status: ACTIVE | COMMUNITY

## 2025-02-21 RX ORDER — ZOLPIDEM TARTRATE 5 MG/1
1 TABLET AT BEDTIME TABLET, FILM COATED ORAL ONCE A DAY
Status: ACTIVE | COMMUNITY

## 2025-02-21 RX ORDER — LEVOTHYROXINE SODIUM 50 UG/1
AS DIRECTED TABLET ORAL
Status: ACTIVE | COMMUNITY
Start: 2023-04-07

## 2025-02-21 RX ORDER — LORAZEPAM 1 MG/1
1 TABLET AT BEDTIME AS NEEDED TABLET ORAL ONCE A DAY
Status: ACTIVE | COMMUNITY

## 2025-02-21 RX ORDER — SERTRALINE 50 MG/1
1 TABLET TABLET, FILM COATED ORAL ONCE A DAY
Status: ACTIVE | COMMUNITY

## 2025-02-21 RX ORDER — TOPIRAMATE 25 MG/1
AS DIRECTED TABLET, COATED ORAL
Status: ACTIVE | COMMUNITY
Start: 2023-04-07

## 2025-03-25 ENCOUNTER — LAB OUTSIDE AN ENCOUNTER (OUTPATIENT)
Dept: URBAN - METROPOLITAN AREA CLINIC 74 | Facility: CLINIC | Age: 60
End: 2025-03-25

## 2025-03-25 ENCOUNTER — OFFICE VISIT (OUTPATIENT)
Dept: URBAN - METROPOLITAN AREA CLINIC 74 | Facility: CLINIC | Age: 60
End: 2025-03-25
Payer: COMMERCIAL

## 2025-03-25 DIAGNOSIS — K21.00 CHRONIC REFLUX ESOPHAGITIS: ICD-10-CM

## 2025-03-25 PROCEDURE — 99214 OFFICE O/P EST MOD 30 MIN: CPT | Performed by: INTERNAL MEDICINE

## 2025-03-25 RX ORDER — SERTRALINE 50 MG/1
1 TABLET TABLET, FILM COATED ORAL ONCE A DAY
Status: ACTIVE | COMMUNITY

## 2025-03-25 RX ORDER — LEVOTHYROXINE SODIUM 50 UG/1
AS DIRECTED TABLET ORAL
Status: ACTIVE | COMMUNITY
Start: 2023-04-07

## 2025-03-25 RX ORDER — DICYCLOMINE HYDROCHLORIDE 20 MG/1
1 TABLET TABLET ORAL THREE TIMES A DAY
Status: ON HOLD | COMMUNITY

## 2025-03-25 RX ORDER — LORAZEPAM 1 MG/1
1 TABLET AT BEDTIME AS NEEDED TABLET ORAL ONCE A DAY
Status: ACTIVE | COMMUNITY

## 2025-03-25 RX ORDER — ZOLPIDEM TARTRATE 5 MG/1
1 TABLET AT BEDTIME TABLET, FILM COATED ORAL ONCE A DAY
Status: ACTIVE | COMMUNITY

## 2025-03-25 RX ORDER — RABEPRAZOLE SODIUM 20 MG/1
1 TABLET TABLET, DELAYED RELEASE ORAL ONCE A DAY
Qty: 90 | Refills: 0 | OUTPATIENT
Start: 2025-03-25

## 2025-03-25 RX ORDER — PANTOPRAZOLE SODIUM 40 MG/1
1 TABLET TABLET, DELAYED RELEASE ORAL ONCE A DAY
Qty: 90 TABLET | Refills: 1 | Status: ACTIVE | COMMUNITY

## 2025-03-25 RX ORDER — ONDANSETRON 4 MG/1
1 TABLET ON THE TONGUE AND ALLOW TO DISSOLVE TABLET, ORALLY DISINTEGRATING ORAL ONCE A DAY
Status: ACTIVE | COMMUNITY

## 2025-03-25 RX ORDER — PANTOPRAZOLE SODIUM 40 MG/1
1 TABLET TABLET, DELAYED RELEASE ORAL ONCE A DAY
OUTPATIENT

## 2025-03-25 RX ORDER — TOPIRAMATE 25 MG/1
AS DIRECTED TABLET, COATED ORAL
Status: ON HOLD | COMMUNITY
Start: 2023-04-07

## 2025-03-25 NOTE — PHYSICAL EXAM GASTROINTESTINAL
Abdomen , soft,  mild SAGAR tenderness, nondistended , no guarding or rigidity , no masses palpable , normal bowel sounds , Liver and Spleen , no hepatomegaly present , no hepatosplenomegaly , liver nontender , spleen not palpable

## 2025-03-25 NOTE — HPI-TODAY'S VISIT:
Ms. Sandoval presents to clinic for follow-up.  She was seen by the physician assistant in the office last in August.  At that appointment she was following up from an ER visit where CT scan showed thickened left side of her colon after she presented with abdominal pain and rectal bleeding.Colonoscopy was completed in December 2024.  This was complete to terminal ileum with only internal hemorrhoids noted.  Biopsies of her colon showed no chronic colitis.Patient thinks that the inflammation may have come from ingesting something that was contaminated.Patient is having issues with acid reflux despite the use of pantoprazole daily.She has breakthrough on an almost daily basis.Patient has been watching her diet.  She does admit to eating late at night.Feels like the food when she eats it is sitting on her chest for a long period of time.  She is not having any roberto dysphagia.She denies any NSAID use.  Recall her last EGD was February 202 4with reflux esophagitis and gastritis noted.

## 2025-04-01 ENCOUNTER — APPOINTMENT (OUTPATIENT)
Dept: URBAN - METROPOLITAN AREA OTHER 10 | Facility: OTHER | Age: 60
Setting detail: DERMATOLOGY
End: 2025-04-01

## 2025-04-01 DIAGNOSIS — L65.8 OTHER SPECIFIED NONSCARRING HAIR LOSS: ICD-10-CM

## 2025-04-01 PROBLEM — L65.9 NONSCARRING HAIR LOSS, UNSPECIFIED: Status: ACTIVE | Noted: 2025-04-01

## 2025-04-01 PROCEDURE — 99204 OFFICE O/P NEW MOD 45 MIN: CPT

## 2025-04-01 PROCEDURE — ? PRESCRIPTION MEDICATION MANAGEMENT

## 2025-04-01 PROCEDURE — ? PRESCRIPTION

## 2025-04-01 PROCEDURE — ? COUNSELING

## 2025-04-01 PROCEDURE — ? ORDER TESTS

## 2025-04-01 RX ORDER — SPIRONOLACTONE 25 MG/1
TABLET, FILM COATED ORAL
Qty: 90 | Refills: 1 | Status: ERX | COMMUNITY
Start: 2025-04-01

## 2025-04-01 RX ADMIN — SPIRONOLACTONE: 25 TABLET, FILM COATED ORAL at 00:00

## 2025-04-01 ASSESSMENT — LOCATION ZONE DERM: LOCATION ZONE: SCALP

## 2025-04-01 ASSESSMENT — LOCATION SIMPLE DESCRIPTION DERM: LOCATION SIMPLE: ANTERIOR SCALP

## 2025-04-01 ASSESSMENT — LOCATION DETAILED DESCRIPTION DERM: LOCATION DETAILED: MID-FRONTAL SCALP

## 2025-04-01 ASSESSMENT — WOMENS ALOPECIA SEVERITY SCALE: PLEASE ASSSESS THE PATIENT'S MID SCALP ALOPECIA SEVERITY BY COMPARING IT TO THESE PHOTOS: MILD HAIR LOSS

## 2025-04-01 NOTE — PROCEDURE: PRESCRIPTION MEDICATION MANAGEMENT
Detail Level: Zone
Initiate Treatment: spironolactone 25 mg QD
Discontinue Regimen: Hair dyes
Render In Strict Bullet Format?: No

## 2025-04-01 NOTE — PROCEDURE: ORDER TESTS
Billing Type: Third-Party Bill
Performing Laboratory: 0
Bill For Surgical Tray: no
Expected Date Of Service: 04/01/2025

## 2025-04-02 ENCOUNTER — TELEPHONE ENCOUNTER (OUTPATIENT)
Dept: URBAN - METROPOLITAN AREA CLINIC 74 | Facility: CLINIC | Age: 60
End: 2025-04-02

## 2025-04-02 RX ORDER — PANTOPRAZOLE SODIUM 40 MG/1
1 TABLET TABLET, DELAYED RELEASE ORAL TWICE A DAY
Qty: 60 TABLET | Refills: 5

## 2025-06-11 ENCOUNTER — TELEPHONE ENCOUNTER (OUTPATIENT)
Dept: URBAN - METROPOLITAN AREA CLINIC 74 | Facility: CLINIC | Age: 60
End: 2025-06-11

## 2025-06-11 RX ORDER — PANTOPRAZOLE SODIUM 40 MG/1
1 TABLET TABLET, DELAYED RELEASE ORAL ONCE A DAY
Qty: 30 TABLET | Refills: 5